# Patient Record
Sex: MALE | Race: BLACK OR AFRICAN AMERICAN | Employment: FULL TIME | URBAN - METROPOLITAN AREA
[De-identification: names, ages, dates, MRNs, and addresses within clinical notes are randomized per-mention and may not be internally consistent; named-entity substitution may affect disease eponyms.]

---

## 2021-04-08 ENCOUNTER — HOSPITAL ENCOUNTER (EMERGENCY)
Age: 51
Discharge: HOME | End: 2021-04-08
Attending: EMERGENCY MEDICINE
Payer: COMMERCIAL

## 2021-04-08 ENCOUNTER — APPOINTMENT (OUTPATIENT)
Dept: CT IMAGING | Age: 51
End: 2021-04-08
Attending: PHYSICIAN ASSISTANT
Payer: COMMERCIAL

## 2021-04-08 VITALS
HEIGHT: 71 IN | DIASTOLIC BLOOD PRESSURE: 70 MMHG | SYSTOLIC BLOOD PRESSURE: 116 MMHG | RESPIRATION RATE: 16 BRPM | OXYGEN SATURATION: 96 % | TEMPERATURE: 98 F | WEIGHT: 285 LBS | HEART RATE: 79 BPM | BODY MASS INDEX: 39.9 KG/M2

## 2021-04-08 DIAGNOSIS — R13.19 ESOPHAGEAL DYSPHAGIA: Primary | ICD-10-CM

## 2021-04-08 PROCEDURE — 99284 EMERGENCY DEPT VISIT MOD MDM: CPT

## 2021-04-08 PROCEDURE — 80053 COMPREHEN METABOLIC PANEL: CPT | Performed by: PHYSICIAN ASSISTANT

## 2021-04-08 PROCEDURE — 85025 COMPLETE CBC W/AUTO DIFF WBC: CPT | Performed by: PHYSICIAN ASSISTANT

## 2021-04-08 PROCEDURE — 70491 CT SOFT TISSUE NECK W/DYE: CPT | Performed by: PHYSICIAN ASSISTANT

## 2021-04-08 PROCEDURE — 96374 THER/PROPH/DIAG INJ IV PUSH: CPT

## 2021-04-08 RX ORDER — MAGNESIUM HYDROXIDE/ALUMINUM HYDROXICE/SIMETHICONE 120; 1200; 1200 MG/30ML; MG/30ML; MG/30ML
30 SUSPENSION ORAL ONCE
Status: COMPLETED | OUTPATIENT
Start: 2021-04-08 | End: 2021-04-08

## 2021-04-08 RX ORDER — KETOROLAC TROMETHAMINE 15 MG/ML
15 INJECTION, SOLUTION INTRAMUSCULAR; INTRAVENOUS ONCE
Status: COMPLETED | OUTPATIENT
Start: 2021-04-08 | End: 2021-04-08

## 2021-04-08 RX ORDER — LIDOCAINE HYDROCHLORIDE 20 MG/ML
10 SOLUTION OROPHARYNGEAL ONCE
Status: COMPLETED | OUTPATIENT
Start: 2021-04-08 | End: 2021-04-08

## 2021-04-08 NOTE — ED INITIAL ASSESSMENT (HPI)
Pt started having r neck pain on Monday worsening pain when he turns his head to left the pain is worse.

## 2021-04-08 NOTE — ED PROVIDER NOTES
Patient Seen in: THE [facility] Emergency Department In [place]      History   Patient presents with:  Neck Pain    Stated Complaint: pain R side of neck  with swallowing, no swelling,     HPI/Subjective:   HPI    Pleasant 19-year-old male.   Arrives to the [facility] no drooling, voice change or stridor. No trismus. His neck is supple without crepitus.   Lung: No distress, RR, no retraction, breath sounds are clear bilaterally      ED Course     Labs Reviewed   COMP METABOLIC PANEL (14) - Abnormal; Notable for the fol significant fluid or mucosal thickening. NECK GLANDS:  The parotid, submandibular, and thyroid glands are unremarkable. LYMPH NODES:  No pathological-appearing or enlarged lymph nodes. SKULL BASE:  Foramina are symmetric without bony erosion.   Bjorn Coppatric Dictated by (CST): Aarti Barrera MD on 4/08/2021 at 8:01 PM     Finalized by (CST): Aarti Barrera MD on 4/08/2021 at 8:06 PM       CT as above. Soft diet until resolution. Referral to ENT for further evaluation.   Patient remains in no acute distress in r

## 2022-01-18 ENCOUNTER — HOSPITAL ENCOUNTER (OUTPATIENT)
Age: 52
Discharge: HOME | End: 2022-01-18
Attending: EMERGENCY MEDICINE
Payer: COMMERCIAL

## 2022-01-18 VITALS
TEMPERATURE: 99 F | SYSTOLIC BLOOD PRESSURE: 151 MMHG | HEART RATE: 90 BPM | OXYGEN SATURATION: 95 % | RESPIRATION RATE: 20 BRPM | DIASTOLIC BLOOD PRESSURE: 83 MMHG

## 2022-01-18 DIAGNOSIS — K42.9 UMBILICAL HERNIA WITHOUT OBSTRUCTION AND WITHOUT GANGRENE: Primary | ICD-10-CM

## 2022-01-18 PROCEDURE — 99214 OFFICE O/P EST MOD 30 MIN: CPT

## 2022-01-18 PROCEDURE — 99213 OFFICE O/P EST LOW 20 MIN: CPT

## 2022-01-18 NOTE — ED INITIAL ASSESSMENT (HPI)
Pt here w/ c/o pain at umbilicus. Has known unrepaired umbilical hernia but today it became severe. No nausea.  States it feels like it maybe went back but still painful

## 2022-01-18 NOTE — ED PROVIDER NOTES
Patient Seen in: [facility]      History   Patient presents with:  Abdomen/Flank Pain    Stated Complaint: severe abdominal pain / possible hernia    Subjective:   HPI    This is a 79-year-old male who has a long history of umbilical eliel nontender. Cardiovascular: Regular without murmurs    Extremities: Good pulses bilaterally. Neuro: Alert and oriented. The patient is moving all extremities there is no focal findings.   ED Course   Labs Reviewed - No data to display       The patien

## (undated) NOTE — LETTER
Date & Time: 1/18/2022, 1:31 PM  Patient: Veronica Henry  Encounter Provider(s):    Xavier To MD       To Whom It May Concern:    Beau Workman was seen and treated in our department on 1/18/2022.  Please allow him light duty and not lifting